# Patient Record
(demographics unavailable — no encounter records)

---

## 2024-11-11 NOTE — PHYSICAL EXAM
[de-identified] : L hand:  Mild swelling  Tender 3rd A1 pulley  Decreased middle ROM  +middle triggering  xrays neg

## 2024-11-11 NOTE — HISTORY OF PRESENT ILLNESS
[de-identified] : L  trigger and pain from raking leaves [FreeTextEntry1] : L hand  [FreeTextEntry5] : No reported injury, pain worsened over the past few months.

## 2024-11-11 NOTE — ASSESSMENT
[FreeTextEntry1] : L MF tendon sheath injection was performed because of pain and inflammation Anesthesia: ethyl chloride sprayed topically Celestone 6mg: An injection of Celestone 1cc Lidocaine: An injection of Lidocaine 1% 1cc Marcaine: An injection of Marcaine 0.5% 1cc   The risks, benefits, and alternatives to cortisone injection were explained in full to the patient. Risks outlined include but are not limited to infection, sepsis, bleeding, scarring, skin discoloration, temporary increase in pain, syncopal episode, failure to resolve symptoms, allergic reaction, symptom recurrence, and elevation of blood sugar in diabetics. Patient understood the risks. All questions were answered. After discussion of options, patient verbally consented to an injection. Sterile prep was done of the injection site. Patient tolerated the procedure well. Advised to ice the injection site this evening.   Activity modification as tolerated Ice as needed NSAIDs as needed Return prn

## 2025-07-10 NOTE — ASSESSMENT
[FreeTextEntry1] :  Trigger finger is a progressive problem that once occurs will be a chronic issue that will likely continue until surgical treatment is necessary. Trigger finger creates a chronic change to the tendon/pulley system in the hand that will be present until surgical release. Treatment options for trigger finger include OTC NSAIDS, prescription NSAIDS, ice, splinting, OT, cortisone injection, and surgical release.   I recommend the patient take over the counter medication such as Advil/Motrin/Aleve or Tylenol for pain and inflammation  de Quervains is a progressive problem that once occurs will be a chronic issue that will likely continue until surgical treatment is necessary. de Quervains creates a chronic change to the tendon/retinacular system in the wrist that will be present until surgical release. Treatment options for de Quervains tendonitis include OTC NSAIDS, prescription NSAIDS, ice, splinting, OT, cortisone injection, and surgical release.   I recommend the patient take over the counter medication such as Advil/Motrin/Aleve or Tylenol for pain and inflammation  L first dorsal compartment tendon sheath injection was performed because of pain and inflammation under aseptic conditions with betadine and alcohol Anesthesia: ethyl chloride sprayed topically Celestone 6mg/1cc: An injection of Celestone 1cc Lidocaine: An injection of Lidocaine 1% 1cc Marcaine: An injection of Marcaine 0.5% 1cc 25G needle     The risks, benefits, and alternatives to cortisone injection were explained in full to the patient. Risks outlined include but are not limited to infection, sepsis, bleeding, scarring, skin discoloration, temporary increase in pain, syncopal episode, failure to resolve symptoms, allergic reaction, symptom recurrence, and elevation of blood sugar in diabetics. Patient understood the risks. All questions were answered. After discussion of options, patient verbally consented to an injection. Sterile prep was done of the injection site. Patient tolerated the procedure well. Advised to ice the injection site this evening.    Return prn

## 2025-07-10 NOTE — HISTORY OF PRESENT ILLNESS
[4] : 4 [Dull/Aching] : dull/aching [de-identified] : L MF trigger- no locking or clicking Last injection was 11/11/2024, which has been helpful   L wrist pain for 2 weeks  No injury [FreeTextEntry1] : L hand

## 2025-07-10 NOTE — REASON FOR VISIT
[FreeTextEntry2] :  07/10/2025:  79-year-old male here today for: follow up L hand pain and trigger fingers. Has been getting new pain within the L wrist. Has been wearing a brace.

## 2025-07-10 NOTE — PHYSICAL EXAM
[de-identified] : L hand:  Mild swelling  Tender 3rd A1 pulley  Decreased middle ROM  +middle triggering   L wrist Mild swelling radial wrist  Tender first dorsal comp Decreased thumb and wrist ROM +Finklesteins